# Patient Record
Sex: MALE | Race: WHITE | URBAN - METROPOLITAN AREA
[De-identification: names, ages, dates, MRNs, and addresses within clinical notes are randomized per-mention and may not be internally consistent; named-entity substitution may affect disease eponyms.]

---

## 2022-10-26 ENCOUNTER — WEB ENCOUNTER (OUTPATIENT)
Dept: URBAN - METROPOLITAN AREA CLINIC 23 | Facility: CLINIC | Age: 42
End: 2022-10-26

## 2022-10-31 ENCOUNTER — OFFICE VISIT (OUTPATIENT)
Dept: URBAN - METROPOLITAN AREA CLINIC 23 | Facility: CLINIC | Age: 42
End: 2022-10-31
Payer: COMMERCIAL

## 2022-10-31 ENCOUNTER — LAB OUTSIDE AN ENCOUNTER (OUTPATIENT)
Dept: URBAN - METROPOLITAN AREA CLINIC 23 | Facility: CLINIC | Age: 42
End: 2022-10-31

## 2022-10-31 ENCOUNTER — WEB ENCOUNTER (OUTPATIENT)
Dept: URBAN - METROPOLITAN AREA CLINIC 23 | Facility: CLINIC | Age: 42
End: 2022-10-31

## 2022-10-31 VITALS
HEART RATE: 85 BPM | SYSTOLIC BLOOD PRESSURE: 148 MMHG | BODY MASS INDEX: 31.84 KG/M2 | WEIGHT: 222.4 LBS | HEIGHT: 70 IN | DIASTOLIC BLOOD PRESSURE: 88 MMHG | TEMPERATURE: 98.1 F

## 2022-10-31 DIAGNOSIS — R19.7 DIARRHEA: ICD-10-CM

## 2022-10-31 DIAGNOSIS — R10.13 DYSPEPSIA: ICD-10-CM

## 2022-10-31 DIAGNOSIS — R12 HEARTBURN: ICD-10-CM

## 2022-10-31 DIAGNOSIS — K62.5 RECTAL BLEEDING: ICD-10-CM

## 2022-10-31 DIAGNOSIS — R10.9 ABDOMINAL CRAMPING: ICD-10-CM

## 2022-10-31 DIAGNOSIS — R14.0 ABDOMINAL BLOATING: ICD-10-CM

## 2022-10-31 DIAGNOSIS — R19.4 CHANGE IN BOWEL HABITS: ICD-10-CM

## 2022-10-31 PROCEDURE — 99244 OFF/OP CNSLTJ NEW/EST MOD 40: CPT | Performed by: INTERNAL MEDICINE

## 2022-10-31 PROCEDURE — 99204 OFFICE O/P NEW MOD 45 MIN: CPT | Performed by: INTERNAL MEDICINE

## 2022-10-31 RX ORDER — LOSARTAN POTASSIUM 50 MG/1
1 TABLET TABLET ORAL ONCE A DAY
Status: ACTIVE | COMMUNITY

## 2022-10-31 RX ORDER — PITAVASTATIN CALCIUM 4.18 MG/1
1 TABLET TABLET, FILM COATED ORAL ONCE A DAY
Status: ACTIVE | COMMUNITY

## 2022-10-31 NOTE — HPI-TODAY'S VISIT:
- 41 yo  male, referred by Dr. Kelton Cruz for further evaluation of GI complaints as detailed below.  A copy of this note will be sent to the referring physician. - Has had GI symptoms all his life.  States that he has had a sensitive gut all his life.  Has noted intermittent episodes of abdominal bloating, flatulence, and abdominal cramping. - About 2.5 months ago he had a significant flare-up of his abdominal symptoms after celebrating his daughter's birthday and eating ice cream cake; he got a lot of lower abdominal cramping and diarrhea. - Has noted intermittent episodes of rectal bleeding over the years, which he attributes to hemorrhoids - Notes multiple food intolerances including creamy foods, creamy salad dressings, ice cream - Patient has never had a colonoscopy in the past - Denies weight loss  - Denies family history of GI malignancies in any first degree relatives - Notes intermittent heartburn and dyspepsia symptoms, for which he takes Pepcid as needed, depending on what he eats

## 2022-11-03 ENCOUNTER — LAB OUTSIDE AN ENCOUNTER (OUTPATIENT)
Dept: URBAN - METROPOLITAN AREA CLINIC 23 | Facility: CLINIC | Age: 42
End: 2022-11-03

## 2022-11-04 ENCOUNTER — OFFICE VISIT (OUTPATIENT)
Dept: URBAN - METROPOLITAN AREA MEDICAL CENTER 27 | Facility: MEDICAL CENTER | Age: 42
End: 2022-11-04
Payer: COMMERCIAL

## 2022-11-04 ENCOUNTER — TELEPHONE ENCOUNTER (OUTPATIENT)
Dept: URBAN - METROPOLITAN AREA CLINIC 92 | Facility: CLINIC | Age: 42
End: 2022-11-04

## 2022-11-04 DIAGNOSIS — R19.7 ACUTE DIARRHEA: ICD-10-CM

## 2022-11-04 DIAGNOSIS — R10.13 ABDOMINAL DISCOMFORT, EPIGASTRIC: ICD-10-CM

## 2022-11-04 DIAGNOSIS — K63.89 BACTERIAL OVERGROWTH SYNDROME: ICD-10-CM

## 2022-11-04 DIAGNOSIS — K62.1 ANAL AND RECTAL POLYP: ICD-10-CM

## 2022-11-04 DIAGNOSIS — K62.5 ANAL BLEEDING: ICD-10-CM

## 2022-11-04 DIAGNOSIS — K22.89 DILATATION OF ESOPHAGUS: ICD-10-CM

## 2022-11-04 PROBLEM — 40719004 EROSIVE ESOPHAGITIS: Status: ACTIVE | Noted: 2022-11-04

## 2022-11-04 PROCEDURE — 43239 EGD BIOPSY SINGLE/MULTIPLE: CPT | Performed by: INTERNAL MEDICINE

## 2022-11-04 PROCEDURE — 45385 COLONOSCOPY W/LESION REMOVAL: CPT | Performed by: INTERNAL MEDICINE

## 2022-11-04 PROCEDURE — 45380 COLONOSCOPY AND BIOPSY: CPT | Performed by: INTERNAL MEDICINE

## 2022-11-04 RX ORDER — LOSARTAN POTASSIUM 50 MG/1
1 TABLET TABLET ORAL ONCE A DAY
Status: ACTIVE | COMMUNITY

## 2022-11-04 RX ORDER — PITAVASTATIN CALCIUM 4.18 MG/1
1 TABLET TABLET, FILM COATED ORAL ONCE A DAY
Status: ACTIVE | COMMUNITY

## 2022-11-09 LAB
CALPROTECTIN, STOOL - QDX: (no result)
GASTROINTESTINAL PATHOGEN: (no result)
OVA AND PARASITE - QDX: (no result)
PANCREATICELASTASE ELISA, STOOL: (no result)

## 2022-12-14 ENCOUNTER — OFFICE VISIT (OUTPATIENT)
Dept: URBAN - METROPOLITAN AREA CLINIC 111 | Facility: CLINIC | Age: 42
End: 2022-12-14
Payer: COMMERCIAL

## 2022-12-14 VITALS
DIASTOLIC BLOOD PRESSURE: 97 MMHG | WEIGHT: 223 LBS | SYSTOLIC BLOOD PRESSURE: 161 MMHG | HEIGHT: 70 IN | BODY MASS INDEX: 31.92 KG/M2 | HEART RATE: 105 BPM

## 2022-12-14 DIAGNOSIS — K58.9 IRRITABLE BOWEL SYNDROME (IBS): ICD-10-CM

## 2022-12-14 DIAGNOSIS — K21.9 GERD: ICD-10-CM

## 2022-12-14 DIAGNOSIS — E66.9 OBESITY: ICD-10-CM

## 2022-12-14 DIAGNOSIS — K44.9 HIATAL HERNIA: ICD-10-CM

## 2022-12-14 DIAGNOSIS — K57.30 DIVERTICULOSIS OF COLON: ICD-10-CM

## 2022-12-14 PROCEDURE — 99214 OFFICE O/P EST MOD 30 MIN: CPT | Performed by: INTERNAL MEDICINE

## 2022-12-14 RX ORDER — PITAVASTATIN CALCIUM 4.18 MG/1
1 TABLET TABLET, FILM COATED ORAL ONCE A DAY
Status: ACTIVE | COMMUNITY

## 2022-12-14 RX ORDER — LOSARTAN POTASSIUM 50 MG/1
1 TABLET TABLET ORAL ONCE A DAY
Status: ACTIVE | COMMUNITY

## 2022-12-14 NOTE — HPI-TODAY'S VISIT:
- 43 yo  male who returns for follow-up - Recent EGD was notable for a small hiatal hernia and LA grade A reflux esophagitis, without evidence of Allan's esophagus.  Random esophageal, gastric, and duodenal biopsies were benign and negative for any significant pathology.  - Since the above, the patient has been taking pantoprazole 20 mg daily, with good control of acid reflux symptoms - Recent stool studies were negative for infection.  Recent colonoscopy was notable for left-sided colonic diverticulosis and was negative for inflammatory bowel disease.  Random colon biopsies were negative for microscopic colitis.  Patient states he no longer has diarrhea. - He feels great today, and he no longer has the GI symptoms he had previously described  - I have discussed with the patient the findings of previous endoscopic procedures, pathology results, and pertinent lab results.  All questions were answered to their satisfaction.

## 2022-12-14 NOTE — PHYSICAL EXAM GASTROINTESTINAL
Abdomen , soft, nontender, nondistended , no guarding or rigidity , no masses palpable , normal bowel sounds , Liver and Spleen , no hepatomegaly present , no hepatosplenomegaly , liver nontender , spleen not palpable DR GRANADOS HERE. REPORT WAS GIVEN.  WAS CHECKED. NO NEW ORDERS WERE GIVEN

## 2023-01-05 ENCOUNTER — DASHBOARD ENCOUNTERS (OUTPATIENT)
Age: 43
End: 2023-01-05

## 2023-01-05 PROBLEM — 414916001 OBESITY: Status: ACTIVE | Noted: 2023-01-05

## 2023-01-05 PROBLEM — 10743008 IRRITABLE BOWEL SYNDROME: Status: ACTIVE | Noted: 2023-01-05

## 2023-01-05 PROBLEM — 235595009 GASTROESOPHAGEAL REFLUX DISEASE: Status: ACTIVE | Noted: 2023-01-05

## 2023-01-05 PROBLEM — 733657002 DIVERTICULOSIS OF COLON: Status: ACTIVE | Noted: 2023-01-05

## 2023-04-25 NOTE — PHYSICAL EXAM CONSTITUTIONAL:
well developed, well nourished , in no acute distress , ambulating without difficulty , normal communication ability Products Recommended: Cerave Detail Level: Detailed General Sunscreen Counseling: I recommended a broad spectrum sunscreen with a SPF of 30 or higher. I explained that SPF 30 sunscreens block approximately 97 percent of the sun's harmful rays. Sunscreens should be applied at least 15 minutes prior to expected sun exposure and then every 2 hours after that as long as sun exposure continues. If swimming or exercising sunscreen should be reapplied every 45 minutes to an hour after getting wet or sweating. One ounce, or the equivalent of a shot glass full of sunscreen, is adequate to protect the skin not covered by a bathing suit. I also recommended a lip balm with a sunscreen as well. Sun protective clothing can be used in lieu of sunscreen but must be worn the entire time you are exposed to the sun's rays.